# Patient Record
Sex: FEMALE | Race: WHITE | NOT HISPANIC OR LATINO | Employment: OTHER | URBAN - METROPOLITAN AREA
[De-identification: names, ages, dates, MRNs, and addresses within clinical notes are randomized per-mention and may not be internally consistent; named-entity substitution may affect disease eponyms.]

---

## 2018-07-25 ENCOUNTER — OFFICE VISIT (OUTPATIENT)
Dept: URGENT CARE | Facility: CLINIC | Age: 48
End: 2018-07-25
Payer: MEDICARE

## 2018-07-25 VITALS
HEART RATE: 85 BPM | HEIGHT: 61 IN | BODY MASS INDEX: 24.73 KG/M2 | OXYGEN SATURATION: 98 % | RESPIRATION RATE: 16 BRPM | DIASTOLIC BLOOD PRESSURE: 73 MMHG | SYSTOLIC BLOOD PRESSURE: 113 MMHG | TEMPERATURE: 98 F | WEIGHT: 131 LBS

## 2018-07-25 DIAGNOSIS — L03.211 CELLULITIS, FACE: Primary | ICD-10-CM

## 2018-07-25 PROCEDURE — 99214 OFFICE O/P EST MOD 30 MIN: CPT | Mod: S$GLB,,, | Performed by: FAMILY MEDICINE

## 2018-07-25 RX ORDER — DEXTROAMPHETAMINE SACCHARATE, AMPHETAMINE ASPARTATE, DEXTROAMPHETAMINE SULFATE AND AMPHETAMINE SULFATE 5; 5; 5; 5 MG/1; MG/1; MG/1; MG/1
20 TABLET ORAL DAILY
Refills: 0 | COMMUNITY
Start: 2018-07-21

## 2018-07-25 RX ORDER — MUPIROCIN 20 MG/G
OINTMENT TOPICAL
Qty: 22 G | Refills: 1 | Status: SHIPPED | OUTPATIENT
Start: 2018-07-25

## 2018-07-25 RX ORDER — SULFAMETHOXAZOLE AND TRIMETHOPRIM 800; 160 MG/1; MG/1
1 TABLET ORAL 2 TIMES DAILY
Qty: 20 TABLET | Refills: 0 | Status: SHIPPED | OUTPATIENT
Start: 2018-07-25 | End: 2018-08-04

## 2018-07-25 RX ORDER — ESTRADIOL 1 MG/1
1 TABLET ORAL DAILY
Refills: 3 | COMMUNITY
Start: 2018-05-13

## 2018-07-25 RX ORDER — BUPRENORPHINE HYDROCHLORIDE, NALOXONE HYDROCHLORIDE 8; 2 MG/1; MG/1
FILM, SOLUBLE BUCCAL; SUBLINGUAL
Refills: 0 | COMMUNITY
Start: 2018-07-11

## 2018-07-25 NOTE — PROGRESS NOTES
"Subjective:       Patient ID: Khushi Galeano is a 48 y.o. female.    Vitals:  height is 5' 1" (1.549 m) and weight is 59.4 kg (131 lb). Her oral temperature is 98 °F (36.7 °C). Her blood pressure is 113/73 and her pulse is 85. Her respiration is 16 and oxygen saturation is 98%.     Chief Complaint: Rash (on left side of face)    Patient states her symptoms started on 7/24/2018.      Rash   This is a new problem. The current episode started yesterday. The problem has been gradually worsening since onset. The affected locations include the face. The rash is characterized by dryness, pain, redness, swelling, itchiness and peeling. She was exposed to nothing. Pertinent negatives include no fever, joint pain, shortness of breath or sore throat. Past treatments include anti-itch cream and antibiotic cream. The treatment provided no relief.     Review of Systems   Constitution: Negative for chills and fever.   HENT: Negative for sore throat.    Respiratory: Negative for shortness of breath.    Skin: Positive for dry skin, itching and rash.   Musculoskeletal: Negative for joint pain.       Objective:      Physical Exam   Constitutional: She is oriented to person, place, and time. She appears well-developed and well-nourished.   HENT:   Head: Normocephalic and atraumatic. Head is without abrasion, without contusion and without laceration.       Right Ear: External ear normal.   Left Ear: External ear normal.   Nose: Nose normal.   Mouth/Throat: Oropharynx is clear and moist.   Eyes: Conjunctivae, EOM and lids are normal. Pupils are equal, round, and reactive to light.   Neck: Trachea normal, full passive range of motion without pain and phonation normal. Neck supple.   Cardiovascular: Normal rate, regular rhythm and normal heart sounds.    Pulmonary/Chest: Effort normal and breath sounds normal. No stridor. No respiratory distress. She has no decreased breath sounds. She has no wheezes. She has no rhonchi. "   Musculoskeletal: Normal range of motion.   Lymphadenopathy:        Head (right side): No submental and no submandibular adenopathy present.        Head (left side): No submental and no submandibular adenopathy present.     She has cervical adenopathy.        Left cervical: Superficial cervical adenopathy present.   Neurological: She is alert and oriented to person, place, and time.   Skin: Skin is warm, dry and intact. Capillary refill takes less than 2 seconds. Rash noted. No abrasion, no bruising, no burn, no ecchymosis, no laceration and no lesion noted. There is erythema.        Psychiatric: She has a normal mood and affect. Her speech is normal and behavior is normal. Judgment and thought content normal. Cognition and memory are normal.   Nursing note and vitals reviewed.      Assessment:       1. Cellulitis, face        Plan:         Cellulitis, face  -     sulfamethoxazole-trimethoprim 800-160mg (BACTRIM DS) 800-160 mg Tab; Take 1 tablet by mouth 2 (two) times daily. for 10 days  Dispense: 20 tablet; Refill: 0  -     mupirocin (BACTROBAN) 2 % ointment; Apply to affected area 3 times daily  Dispense: 22 g; Refill: 1      Follow Up Comments   Make sure that you follow up with your primary care doctor in the next 2-5 days if needed .  Return to the Urgent Care if signs or symptoms change and certainly if you have worsening symptoms go to the nearest emergency department for further evaluation.

## 2018-07-25 NOTE — PATIENT INSTRUCTIONS
Facial Cellulitis  Cellulitis is an infection of the deep layers of skin. A break in the skin, such as a cut or scratch, can let bacteria under the skin. It may also occur from an infected oil gland (pimple) or hair follicle. If the bacteria get to deep layers of the skin, it can be serious. If not treated, cellulitis can get into the bloodstream and lymph nodes. The infection can then spread throughout the body. This causes serious illness.  Cellulitis causes the affected skin to become red, swollen, warm, and sore. The reddened areas have a visible border. You may have a fever, chills, and pain.  Cellulitis is treated with antibiotics taken for 7 to 10 days. Symptoms should get better 1 to 2 days after treatment is started. Make sure to take all the antibiotics for the full number of days until they are gone. Keep taking the medication even if your symptoms go away.  Home care  Follow these tips:  · Take all of the antibiotic medicine exactly as directed until it is gone. Dont miss any doses, especially during the first 7 days. Dont stop taking it when your symptoms get better.  · Use a cool compress (face cloth soaked in cool water) on your face to help reduce swelling and pain.  · You may use acetaminophen or ibuprofen to reduce pain. Dont use these if you have chronic liver or kidney disease, or ever had a stomach ulcer or gastrointestinal bleeding. Talk with your healthcare provider first.  Follow-up care  Follow up with your healthcare provider, or as advised. If your infection does not go away on the first antibiotic, your healthcare provider will prescribe a different one.  When to seek medical advice  Call your healthcare provider right away if any of these occur:  · Fever higher of 100.4º F (38.0º C) or higher after 2 days on antibiotics  · Red areas that spread  · Swelling or pain that gets worse  · Fluid leaking from the skin (pus)  · An eyelid that swells shut or leaks fluid (pus)  · Headache or  neck pain that gets worse  · Unusual drowsiness or confusion  · Convulsions (seizure)  · Change in eyesight     Date Last Reviewed: 9/1/2016 © 2000-2017 [email or web address]. [address] [place] [address], [place] [address]. All rights reserved. This information is not intended as a substitute for professional medical care. Always follow your healthcare professional's instructions.    Khushi was seen today for rash.    Diagnoses and all orders for this visit:    Cellulitis, face  -     sulfamethoxazole-trimethoprim 800-160mg (BACTRIM DS) 800-160 mg Tab; Take 1 tablet by mouth 2 (two) times daily. for 10 days  -     mupirocin (BACTROBAN) 2 % ointment; Apply to affected area 3 times daily      Hibiclens cleanser can be used,  Get from pharmacy behind the counter,  Use as shower gel 3 times per week.       Follow Up Comments   Make sure that you follow up with your primary care doctor in the next 2-5 days if needed .  Return to the Urgent Care if signs or symptoms change and certainly if you have worsening symptoms go to the nearest emergency department for further evaluation.     Mackenzie Vanegas MD

## 2018-07-27 ENCOUNTER — NURSE TRIAGE (OUTPATIENT)
Dept: ADMINISTRATIVE | Facility: CLINIC | Age: 48
End: 2018-07-27

## 2018-07-27 NOTE — TELEPHONE ENCOUNTER
"Pt called re swelling gone down some. Pain gone down. Having drainage and yellow pus under surface.     Reason for Disposition   [1] Caller has URGENT question (includes prescribed medication questions) AND [2] triager unable to answer    Answer Assessment - Initial Assessment Questions  1. MAIN CONCERN OR SYMPTOM:  "What is your main concern right now?" "What question do you have?" "What's the main symptom you're worried about?" (e.g., breathing difficulty, cough, fever. pain)     Afeb. Wound feels warm. Red as fire - possible sl smaller   2. ONSET: "When did the  ________  start?"     7/25  3. BETTER-SAME-WORSE: "Are you getting better, staying the same, or getting worse compared to how you felt at your last visit to the doctor (most recent medical visit)"?     Sl better- yellow pus under skin   4. VISIT DATE: "When were you seen?" (Date)     7/25  5. VISIT DOCTOR: "What is the name of the doctor taking care of you now?"     [facility]- still on ibuprofen and abx. Having indigestion   6. VISIT DIAGNOSIS:  "What was the main symptom or problem that you were seen for?" "Were you given a diagnosis?"      Cellulitis from pimple- hx cystic acne   7. VISIT MEDICATIONS: "Did the physician order any new medicines for you to use?" If yes: "Have you filled the prescription and started taking the medicine?"      Bactrim BS   8. NEXT APPOINTMENT: "Have you scheduled a follow-up appointment with your doctor?"     No   9. PAIN: "Is there any pain?" If so, ask: "How bad is it?"  (Scale 1-10; or mild, moderate, severe)    With ibuprofen 3   10. FEVER: "Do you have a fever?" If so, ask: "What is it, how was it measured  and when did it start?"       afeb   11. OTHER SYMPTOMS: "Do you have any other symptoms?"      No    Protocols used: ST RECENT MEDICAL VISIT FOR ILLNESS FOLLOW-UP CALL-A-  rec to speak with PCP due to yellow pus under skin- if no response within an hour rec to proceed to UC/ED. Spoke with Geneva at dr oscar office at " [phone] to please call pt. Call back with questions.

## 2020-02-06 ENCOUNTER — OFFICE VISIT (OUTPATIENT)
Dept: UROGYNECOLOGY | Facility: CLINIC | Age: 50
End: 2020-02-06
Payer: MEDICARE

## 2020-02-06 VITALS
BODY MASS INDEX: 25.3 KG/M2 | HEIGHT: 61 IN | WEIGHT: 134 LBS | SYSTOLIC BLOOD PRESSURE: 121 MMHG | DIASTOLIC BLOOD PRESSURE: 78 MMHG

## 2020-02-06 DIAGNOSIS — R39.15 URINARY URGENCY: ICD-10-CM

## 2020-02-06 DIAGNOSIS — N95.2 POSTMENOPAUSE ATROPHIC VAGINITIS: ICD-10-CM

## 2020-02-06 DIAGNOSIS — M62.89 PELVIC FLOOR TENSION: ICD-10-CM

## 2020-02-06 DIAGNOSIS — N32.81 OAB (OVERACTIVE BLADDER): ICD-10-CM

## 2020-02-06 PROCEDURE — 99203 OFFICE O/P NEW LOW 30 MIN: CPT | Mod: S$GLB,,, | Performed by: OBSTETRICS & GYNECOLOGY

## 2020-02-06 PROCEDURE — 3008F PR BODY MASS INDEX (BMI) DOCUMENTED: ICD-10-PCS | Mod: CPTII,S$GLB,, | Performed by: OBSTETRICS & GYNECOLOGY

## 2020-02-06 PROCEDURE — 99999 PR PBB SHADOW E&M-EST. PATIENT-LVL III: ICD-10-PCS | Mod: PBBFAC,,, | Performed by: OBSTETRICS & GYNECOLOGY

## 2020-02-06 PROCEDURE — 3008F BODY MASS INDEX DOCD: CPT | Mod: CPTII,S$GLB,, | Performed by: OBSTETRICS & GYNECOLOGY

## 2020-02-06 PROCEDURE — 99999 PR PBB SHADOW E&M-EST. PATIENT-LVL III: CPT | Mod: PBBFAC,,, | Performed by: OBSTETRICS & GYNECOLOGY

## 2020-02-06 PROCEDURE — 99203 PR OFFICE/OUTPT VISIT, NEW, LEVL III, 30-44 MIN: ICD-10-PCS | Mod: S$GLB,,, | Performed by: OBSTETRICS & GYNECOLOGY

## 2020-02-06 RX ORDER — HYDROXYZINE HYDROCHLORIDE 25 MG/1
25 TABLET, FILM COATED ORAL NIGHTLY
Qty: 30 TABLET | Refills: 3 | Status: SHIPPED | OUTPATIENT
Start: 2020-02-06 | End: 2020-06-10

## 2020-02-06 RX ORDER — DIAZEPAM 5 MG/1
5 TABLET ORAL NIGHTLY
Qty: 30 TABLET | Refills: 3 | Status: SHIPPED | OUTPATIENT
Start: 2020-02-06 | End: 2020-03-07

## 2020-02-06 NOTE — PROGRESS NOTES
51 y/o G0 s/p hys 12 years ago. Urinary frequency mostly at night. Can hold it during the day and is not having urge incontinence. +Stress incontinence with vomiting and running. Has an accident from stress leakage about 1x/week. Only leaking, not whole bladder empyting. Feels like not emptying bladder all the way, may have to go back to toilet immediately or 30 mins later. Nocturia 5-6x/night. S/p hyst 12 years ago. Feels like since then, has had problems with bladder, but worsened over the years. Cannot have sex at all, severe pain with initial penetration and deep penetration. started on estradiol suppository about 1 month ago, has not seen any improvement. Drinks a lot during the day b/c antipsych meds make her thirsty. - diabetes.

## 2020-02-06 NOTE — PROGRESS NOTES
Subjective:      Patient ID: Khushi Galeano is a 50 y.o. female.    Chief Complaint:  Urinary Frequency; Urinary Incontinence; Nocturia (x5); and Dyspareunia (x1 yr. after )      History of Present Illness  50-year-old  0 history of robotic hysterectomy 12 years ago.  Patient is presenting secondary to complaint of urinary frequency urgency with associated incontinence as well as episodes of leaking with coughing sneezing exercise patient had a gastrointestinal of flexion last week there was significant incontinence with the vomiting.  Additionally patient with episodes of nocturia 5-6 times  Patient also complains of dyspareunia which started after hysterectomy.  Patient is requesting assistance.  She has just been started on hormone supplementation by her gynecologist in the form of vaginal estrogen suppositories          Past Medical History:   Diagnosis Date    Bipolar affective disorder, depressed, severe degree, without mention of psychotic behavior 2013    Depression     History of psychiatric care     History of psychiatric hospitalization     Psychiatric problem     Psychosis     Rheumatoid arthritis(714.0)     Therapy        Past Surgical History:   Procedure Laterality Date    APPENDECTOMY      TOTAL ABDOMINAL HYSTERECTOMY W/ BILATERAL SALPINGOOPHORECTOMY         GYN & OB History  No LMP recorded. Patient has had a hysterectomy.   Date of Last Pap: No result found    OB History    Para Term  AB Living   0             SAB TAB Ectopic Multiple Live Births                   Health Maintenance       Date Due Completion Date    TETANUS VACCINE 1988 ---    Mammogram 2012    Lipid Panel 2019    Influenza Vaccine (1) 2019 ---    Shingles Vaccine (1 of 2) 2020 ---    Colonoscopy 2020 ---          Family History   Problem Relation Age of Onset    Bipolar disorder Mother     Alcohol abuse Mother     Bipolar disorder  Other     Alcohol abuse Maternal Grandfather     Bipolar disorder Maternal Grandfather     Dementia Maternal Grandmother     No Known Problems Father     No Known Problems Sister     No Known Problems Sister     Suicide Neg Hx        Social History     Socioeconomic History    Marital status:      Spouse name: Not on file    Number of children: 0    Years of education: Not on file    Highest education level: Not on file   Occupational History    Occupation: disabled   Social Needs    Financial resource strain: Not on file    Food insecurity:     Worry: Not on file     Inability: Not on file    Transportation needs:     Medical: Not on file     Non-medical: Not on file   Tobacco Use    Smoking status: Never Smoker    Smokeless tobacco: Never Used   Substance and Sexual Activity    Alcohol use: Yes    Drug use: No    Sexual activity: Yes     Partners: Male   Lifestyle    Physical activity:     Days per week: Not on file     Minutes per session: Not on file    Stress: Not on file   Relationships    Social connections:     Talks on phone: Not on file     Gets together: Not on file     Attends [employer] service: Not on file     Active member of club or organization: Not on file     Attends meetings of clubs or organizations: Not on file     Relationship status: Not on file   Other Topics Concern    Patient feels they ought to cut down on drinking/drug use Not Asked    Patient annoyed by others criticizing their drinking/drug use Not Asked    Patient has felt bad or guilty about drinking/drug use Not Asked    Patient has had a drink/used drugs as an eye opener in the AM Not Asked   Social History Narrative    Pt has a younger sister and a younger 1/2 sister.  Her parents  when she was 18.  She has a MSW degree, was never in the , and worked as a  until Feb 2012 when she began receiving disability income.  She  the first time at 28 and  3 years  "later with no childeren.  She  her present  at 37.  They have no children.  She does have a pet dog and enjoyed TV and reading.  She was raised Oriental orthodox, but is currently inactive.       Review of Systems  Review of Systems   Genitourinary: Positive for dyspareunia, frequency, pelvic pain and urgency.          Objective:   /78 (BP Location: Right arm, Patient Position: Sitting)   Ht 5' 1" (1.549 m)   Wt 60.8 kg (134 lb)   BMI 25.32 kg/m²     Physical Exam   Genitourinary: Pelvic exam was performed with patient supine. Skenes normal and bartholins normal. Right labia normal and left labia normal. Urethra exhibits no urethral caruncle, no urethral diverticulum, no urethral mass and no hypermobility. There is tenderness (Tenderness of the possibly long trigone) and atrophy in the vagina. No rectocele, cystocele or unspecified prolapse of vaginal walls in the vagina. Right adnexum displays no mass. Left adnexum displays no mass. Cervix exhibits absence. Uterus is absent. Kegel: 2/5    POP-Q  Aa: -3 Ba:  C: -9   GH: 2 PB: 2 TVL: 9   Ap: -3 Bp:  D:                      Genitourinary Comments: Pre point testing with con tip swab did not elicit any tenderness at 5:00 a.m., 6:00 a.m., 7:00 a.m.    On deep palpation of ischial spine coccygeal is tenderness possibly right left both equally tender        Assessment:     1. Pelvic floor tension    2. Urinary urgency    3. OAB (overactive bladder)    4. Postmenopause atrophic vaginitis            Plan:     1. Pelvic floor tension    2. Urinary urgency    3. OAB (overactive bladder)    4. Postmenopause atrophic vaginitis      After examination we had patient dressed present my office for direct face-to-face discussion.  We started at our by the discussing the criteria for away B appears were going to use the Myrbetriq in come combination with hydroxyzine hopefully this will lead to decrease in nocturia as well as overall voiding.  Pelvic floor physical " therapy will assist us in regaining control of the pelvic floor vaginal Valium to remove provide patient to some relief of the vaginal floor the pelvic floor tenderness the levator a night.  Will see the patient back in about 6 weeks at that point we will re-evaluate.    Please please note that I acted as a chaperone to PA during the examination I will be examining the patient myself next time which returns in 4-6 weeks.  Total time 25 min greater than 50% time 15 min direct discussion with the patient reviewing plan      Patient Instructions

## 2020-02-21 ENCOUNTER — TELEPHONE (OUTPATIENT)
Dept: UROGYNECOLOGY | Facility: CLINIC | Age: 50
End: 2020-02-21

## 2021-04-15 ENCOUNTER — PATIENT MESSAGE (OUTPATIENT)
Dept: RESEARCH | Facility: HOSPITAL | Age: 51
End: 2021-04-15